# Patient Record
Sex: FEMALE | Race: WHITE | NOT HISPANIC OR LATINO | Employment: PART TIME | URBAN - METROPOLITAN AREA
[De-identification: names, ages, dates, MRNs, and addresses within clinical notes are randomized per-mention and may not be internally consistent; named-entity substitution may affect disease eponyms.]

---

## 2017-08-21 ENCOUNTER — APPOINTMENT (EMERGENCY)
Dept: RADIOLOGY | Facility: HOSPITAL | Age: 22
End: 2017-08-21
Payer: COMMERCIAL

## 2017-08-21 ENCOUNTER — HOSPITAL ENCOUNTER (EMERGENCY)
Facility: HOSPITAL | Age: 22
Discharge: HOME | End: 2017-08-21
Admitting: EMERGENCY MEDICINE
Payer: COMMERCIAL

## 2017-08-21 VITALS
WEIGHT: 144 LBS | OXYGEN SATURATION: 97 % | TEMPERATURE: 98.6 F | HEART RATE: 79 BPM | SYSTOLIC BLOOD PRESSURE: 130 MMHG | DIASTOLIC BLOOD PRESSURE: 60 MMHG | RESPIRATION RATE: 16 BRPM

## 2017-08-21 DIAGNOSIS — S93.602A SPRAIN OF LEFT FOOT, INITIAL ENCOUNTER: Primary | ICD-10-CM

## 2017-08-21 PROCEDURE — 99284 EMERGENCY DEPT VISIT MOD MDM: CPT

## 2017-08-21 PROCEDURE — 73630 X-RAY EXAM OF FOOT: CPT

## 2017-08-21 RX ORDER — ACETAMINOPHEN 325 MG/1
650 TABLET ORAL ONCE
Status: COMPLETED | OUTPATIENT
Start: 2017-08-21 | End: 2017-08-21

## 2017-08-21 RX ORDER — FLUOXETINE 20 MG/1
20 TABLET, FILM COATED ORAL DAILY
COMMUNITY

## 2017-08-21 RX ADMIN — ACETAMINOPHEN 650 MG: 325 TABLET, FILM COATED ORAL at 18:54

## 2024-04-14 ENCOUNTER — HOSPITAL ENCOUNTER (EMERGENCY)
Facility: HOSPITAL | Age: 29
Discharge: HOME | End: 2024-04-14
Attending: EMERGENCY MEDICINE | Admitting: EMERGENCY MEDICINE

## 2024-04-14 VITALS
RESPIRATION RATE: 18 BRPM | SYSTOLIC BLOOD PRESSURE: 102 MMHG | DIASTOLIC BLOOD PRESSURE: 51 MMHG | HEART RATE: 70 BPM | WEIGHT: 173.06 LBS | TEMPERATURE: 98 F | OXYGEN SATURATION: 99 %

## 2024-04-14 DIAGNOSIS — G89.18 POST-OP PAIN: ICD-10-CM

## 2024-04-14 DIAGNOSIS — R10.2 PELVIC CRAMPING: Primary | ICD-10-CM

## 2024-04-14 LAB
AMORPH URATE CRY URNS QL MICRO: ABNORMAL
ANION GAP SERPL CALCULATED.3IONS-SCNC: 5 MMOL/L (ref 4–13)
B-HCG SERPL-ACNC: 9604 MIU/ML (ref 0–5)
BACTERIA UR QL AUTO: ABNORMAL /HPF
BASOPHILS # BLD AUTO: 0.02 THOUSANDS/ÂΜL (ref 0–0.1)
BASOPHILS NFR BLD AUTO: 0 % (ref 0–1)
BILIRUB UR QL STRIP: NEGATIVE
BUN SERPL-MCNC: 10 MG/DL (ref 5–25)
CALCIUM SERPL-MCNC: 8.8 MG/DL (ref 8.4–10.2)
CHLORIDE SERPL-SCNC: 107 MMOL/L (ref 96–108)
CLARITY UR: CLEAR
CO2 SERPL-SCNC: 27 MMOL/L (ref 21–32)
COLOR UR: YELLOW
CREAT SERPL-MCNC: 0.88 MG/DL (ref 0.6–1.3)
EOSINOPHIL # BLD AUTO: 0.17 THOUSAND/ÂΜL (ref 0–0.61)
EOSINOPHIL NFR BLD AUTO: 3 % (ref 0–6)
ERYTHROCYTE [DISTWIDTH] IN BLOOD BY AUTOMATED COUNT: 12.2 % (ref 11.6–15.1)
GFR SERPL CREATININE-BSD FRML MDRD: 90 ML/MIN/1.73SQ M
GLUCOSE SERPL-MCNC: 95 MG/DL (ref 65–140)
GLUCOSE UR STRIP-MCNC: NEGATIVE MG/DL
HCT VFR BLD AUTO: 34.3 % (ref 34.8–46.1)
HGB BLD-MCNC: 11.4 G/DL (ref 11.5–15.4)
HGB UR QL STRIP.AUTO: ABNORMAL
HYALINE CASTS #/AREA URNS LPF: ABNORMAL /LPF
IMM GRANULOCYTES # BLD AUTO: 0.03 THOUSAND/UL (ref 0–0.2)
IMM GRANULOCYTES NFR BLD AUTO: 1 % (ref 0–2)
KETONES UR STRIP-MCNC: NEGATIVE MG/DL
LEUKOCYTE ESTERASE UR QL STRIP: ABNORMAL
LYMPHOCYTES # BLD AUTO: 1.77 THOUSANDS/ÂΜL (ref 0.6–4.47)
LYMPHOCYTES NFR BLD AUTO: 31 % (ref 14–44)
MAGNESIUM SERPL-MCNC: 1.9 MG/DL (ref 1.9–2.7)
MCH RBC QN AUTO: 29.3 PG (ref 26.8–34.3)
MCHC RBC AUTO-ENTMCNC: 33.2 G/DL (ref 31.4–37.4)
MCV RBC AUTO: 88 FL (ref 82–98)
MONOCYTES # BLD AUTO: 0.62 THOUSAND/ÂΜL (ref 0.17–1.22)
MONOCYTES NFR BLD AUTO: 11 % (ref 4–12)
MUCOUS THREADS UR QL AUTO: ABNORMAL
NEUTROPHILS # BLD AUTO: 3.05 THOUSANDS/ÂΜL (ref 1.85–7.62)
NEUTS SEG NFR BLD AUTO: 54 % (ref 43–75)
NITRITE UR QL STRIP: NEGATIVE
NON-SQ EPI CELLS URNS QL MICRO: ABNORMAL /HPF
NRBC BLD AUTO-RTO: 0 /100 WBCS
PH UR STRIP.AUTO: 7.5 [PH] (ref 4.5–8)
PLATELET # BLD AUTO: 302 THOUSANDS/UL (ref 149–390)
PMV BLD AUTO: 10.1 FL (ref 8.9–12.7)
POTASSIUM SERPL-SCNC: 3.8 MMOL/L (ref 3.5–5.3)
PROT UR STRIP-MCNC: NEGATIVE MG/DL
RBC # BLD AUTO: 3.89 MILLION/UL (ref 3.81–5.12)
RBC #/AREA URNS AUTO: ABNORMAL /HPF
SODIUM SERPL-SCNC: 139 MMOL/L (ref 135–147)
SP GR UR STRIP.AUTO: 1.02 (ref 1–1.03)
UROBILINOGEN UR QL STRIP.AUTO: 1 E.U./DL
WBC # BLD AUTO: 5.66 THOUSAND/UL (ref 4.31–10.16)
WBC #/AREA URNS AUTO: ABNORMAL /HPF

## 2024-04-14 PROCEDURE — 81001 URINALYSIS AUTO W/SCOPE: CPT

## 2024-04-14 PROCEDURE — 84702 CHORIONIC GONADOTROPIN TEST: CPT

## 2024-04-14 PROCEDURE — 36415 COLL VENOUS BLD VENIPUNCTURE: CPT

## 2024-04-14 PROCEDURE — 99284 EMERGENCY DEPT VISIT MOD MDM: CPT

## 2024-04-14 PROCEDURE — 83735 ASSAY OF MAGNESIUM: CPT

## 2024-04-14 PROCEDURE — 96361 HYDRATE IV INFUSION ADD-ON: CPT

## 2024-04-14 PROCEDURE — 80048 BASIC METABOLIC PNL TOTAL CA: CPT

## 2024-04-14 PROCEDURE — 96374 THER/PROPH/DIAG INJ IV PUSH: CPT

## 2024-04-14 PROCEDURE — 85025 COMPLETE CBC W/AUTO DIFF WBC: CPT

## 2024-04-14 PROCEDURE — 96375 TX/PRO/DX INJ NEW DRUG ADDON: CPT

## 2024-04-14 RX ORDER — METOCLOPRAMIDE HYDROCHLORIDE 5 MG/ML
10 INJECTION INTRAMUSCULAR; INTRAVENOUS ONCE
Status: COMPLETED | OUTPATIENT
Start: 2024-04-14 | End: 2024-04-14

## 2024-04-14 RX ORDER — KETOROLAC TROMETHAMINE 30 MG/ML
15 INJECTION, SOLUTION INTRAMUSCULAR; INTRAVENOUS ONCE
Status: COMPLETED | OUTPATIENT
Start: 2024-04-14 | End: 2024-04-14

## 2024-04-14 RX ORDER — FENTANYL CITRATE 50 UG/ML
50 INJECTION, SOLUTION INTRAMUSCULAR; INTRAVENOUS ONCE
Status: COMPLETED | OUTPATIENT
Start: 2024-04-14 | End: 2024-04-14

## 2024-04-14 RX ADMIN — SODIUM CHLORIDE 1000 ML: 0.9 INJECTION, SOLUTION INTRAVENOUS at 22:09

## 2024-04-14 RX ADMIN — FENTANYL CITRATE 50 MCG: 50 INJECTION INTRAMUSCULAR; INTRAVENOUS at 23:27

## 2024-04-14 RX ADMIN — KETOROLAC TROMETHAMINE 15 MG: 30 INJECTION, SOLUTION INTRAMUSCULAR; INTRAVENOUS at 22:10

## 2024-04-14 RX ADMIN — METOCLOPRAMIDE HYDROCHLORIDE 10 MG: 5 INJECTION INTRAMUSCULAR; INTRAVENOUS at 22:10

## 2024-04-15 ENCOUNTER — APPOINTMENT (EMERGENCY)
Dept: ULTRASOUND IMAGING | Facility: HOSPITAL | Age: 29
End: 2024-04-15

## 2024-04-15 ENCOUNTER — HOSPITAL ENCOUNTER (EMERGENCY)
Facility: HOSPITAL | Age: 29
Discharge: HOME | End: 2024-04-15
Attending: EMERGENCY MEDICINE

## 2024-04-15 VITALS
SYSTOLIC BLOOD PRESSURE: 126 MMHG | RESPIRATION RATE: 18 BRPM | TEMPERATURE: 98 F | DIASTOLIC BLOOD PRESSURE: 61 MMHG | OXYGEN SATURATION: 100 % | WEIGHT: 173.06 LBS | HEART RATE: 58 BPM

## 2024-04-15 DIAGNOSIS — O03.4 RETAINED PRODUCTS OF CONCEPTION FOLLOWING ABORTION: Primary | ICD-10-CM

## 2024-04-15 DIAGNOSIS — Z87.42 HISTORY OF TERMINATION OF PREGNANCY: ICD-10-CM

## 2024-04-15 LAB
BASOPHILS # BLD AUTO: 0.04 THOUSANDS/ÂΜL (ref 0–0.1)
BASOPHILS NFR BLD AUTO: 1 % (ref 0–1)
EOSINOPHIL # BLD AUTO: 0.2 THOUSAND/ÂΜL (ref 0–0.61)
EOSINOPHIL NFR BLD AUTO: 3 % (ref 0–6)
ERYTHROCYTE [DISTWIDTH] IN BLOOD BY AUTOMATED COUNT: 12.2 % (ref 11.6–15.1)
HCT VFR BLD AUTO: 36.4 % (ref 34.8–46.1)
HGB BLD-MCNC: 11.9 G/DL (ref 11.5–15.4)
IMM GRANULOCYTES # BLD AUTO: 0.01 THOUSAND/UL (ref 0–0.2)
IMM GRANULOCYTES NFR BLD AUTO: 0 % (ref 0–2)
LYMPHOCYTES # BLD AUTO: 2.32 THOUSANDS/ÂΜL (ref 0.6–4.47)
LYMPHOCYTES NFR BLD AUTO: 40 % (ref 14–44)
MCH RBC QN AUTO: 28.8 PG (ref 26.8–34.3)
MCHC RBC AUTO-ENTMCNC: 32.7 G/DL (ref 31.4–37.4)
MCV RBC AUTO: 88 FL (ref 82–98)
MONOCYTES # BLD AUTO: 0.79 THOUSAND/ÂΜL (ref 0.17–1.22)
MONOCYTES NFR BLD AUTO: 14 % (ref 4–12)
NEUTROPHILS # BLD AUTO: 2.47 THOUSANDS/ÂΜL (ref 1.85–7.62)
NEUTS SEG NFR BLD AUTO: 42 % (ref 43–75)
NRBC BLD AUTO-RTO: 0 /100 WBCS
PLATELET # BLD AUTO: 339 THOUSANDS/UL (ref 149–390)
PMV BLD AUTO: 9.7 FL (ref 8.9–12.7)
RBC # BLD AUTO: 4.13 MILLION/UL (ref 3.81–5.12)
WBC # BLD AUTO: 5.83 THOUSAND/UL (ref 4.31–10.16)

## 2024-04-15 PROCEDURE — 85025 COMPLETE CBC W/AUTO DIFF WBC: CPT | Performed by: EMERGENCY MEDICINE

## 2024-04-15 PROCEDURE — 76856 US EXAM PELVIC COMPLETE: CPT

## 2024-04-15 PROCEDURE — 36415 COLL VENOUS BLD VENIPUNCTURE: CPT | Performed by: EMERGENCY MEDICINE

## 2024-04-15 PROCEDURE — NC001 PR NO CHARGE: Performed by: OBSTETRICS & GYNECOLOGY

## 2024-04-15 PROCEDURE — 99284 EMERGENCY DEPT VISIT MOD MDM: CPT

## 2024-04-15 RX ORDER — MISOPROSTOL 200 UG/1
800 TABLET ORAL ONCE
Qty: 4 TABLET | Refills: 0 | Status: SHIPPED | OUTPATIENT
Start: 2024-04-15 | End: 2024-04-15

## 2024-04-15 NOTE — ED PROVIDER NOTES
History  Chief Complaint   Patient presents with    Abdominal Pain     Patient states had an  3 days ago, having abdominal cramps radiating to back and leg, also has an untreated uti that she states should have been treated before her surgery      Ivet is a 28-year-old female G1, P0 presenting to the emergency department 3 days after surgical  with lower abdominal cramping x 1 day.  She reports that since the procedure she has had mild cramping and bleeding.  Her bleeding is tapering off but her abdominal cramping became worse in the past few hours and is now going into her back.  She had the procedure done at [facility].  Denies fevers, chills.  Reports that she had a UTI diagnosed prior to the procedure but did not take the antibiotics, is now concerned that the UTI may be worse.      Abdominal Pain  Pain location:  Suprapubic  Pain quality: cramping    Pain radiates to:  L leg and R leg  Pain severity:  Severe  Onset quality:  Gradual  Duration:  1 day  Timing:  Constant  Progression:  Worsening  Chronicity:  New  Relieved by:  Nothing  Worsened by:  Nothing  Ineffective treatments:  None tried  Associated symptoms: vaginal bleeding    Associated symptoms: no anorexia, no belching, no chest pain, no chills, no constipation, no cough, no diarrhea, no dysuria, no fatigue, no fever, no flatus, no hematemesis, no hematochezia, no hematuria, no melena, no nausea, no shortness of breath, no vaginal discharge and no vomiting        Prior to Admission Medications   Prescriptions Last Dose Informant Patient Reported? Taking?   FLUoxetine (PROzac) 20 MG tablet   Yes No   Sig: Take 20 mg by mouth daily      Facility-Administered Medications: None       No past medical history on file.    No past surgical history on file.    No family history on file.  I have reviewed and agree with the history as documented.    E-Cigarette/Vaping     E-Cigarette/Vaping Substances     Social History     Tobacco Use     Smoking status: Never   Substance Use Topics    Alcohol use: No    Drug use: No       Review of Systems   Constitutional:  Negative for chills, fatigue and fever.   Respiratory:  Negative for cough and shortness of breath.    Cardiovascular:  Negative for chest pain.   Gastrointestinal:  Positive for abdominal pain. Negative for anorexia, constipation, diarrhea, flatus, hematemesis, hematochezia, melena, nausea and vomiting.   Genitourinary:  Positive for vaginal bleeding. Negative for dysuria, hematuria and vaginal discharge.   Neurological:  Negative for weakness and numbness.   All other systems reviewed and are negative.      Physical Exam  Physical Exam  Vitals and nursing note reviewed.   Constitutional:       General: She is not in acute distress.     Appearance: She is well-developed. She is not ill-appearing.   HENT:      Head: Normocephalic and atraumatic.   Eyes:      Conjunctiva/sclera: Conjunctivae normal.   Cardiovascular:      Rate and Rhythm: Normal rate and regular rhythm.      Heart sounds: Normal heart sounds. No murmur heard.  Pulmonary:      Effort: Pulmonary effort is normal. No respiratory distress.      Breath sounds: Normal breath sounds. No wheezing, rhonchi or rales.   Abdominal:      Palpations: Abdomen is soft.      Tenderness: There is abdominal tenderness in the right lower quadrant, suprapubic area and left lower quadrant. There is no right CVA tenderness, left CVA tenderness, guarding or rebound.      Hernia: No hernia is present.   Musculoskeletal:         General: No swelling.      Cervical back: Neck supple.   Skin:     General: Skin is warm and dry.      Capillary Refill: Capillary refill takes less than 2 seconds.      Coloration: Skin is not pale.      Findings: No rash.   Neurological:      General: No focal deficit present.      Mental Status: She is alert and oriented to person, place, and time.      Cranial Nerves: No cranial nerve deficit.   Psychiatric:         Mood  and Affect: Mood normal.         Vital Signs  ED Triage Vitals   Temperature Pulse Respirations Blood Pressure SpO2   04/14/24 2124 04/14/24 2124 04/14/24 2124 04/14/24 2124 04/14/24 2124   98 °F (36.7 °C) 73 18 135/65 99 %      Temp src Heart Rate Source Patient Position - Orthostatic VS BP Location FiO2 (%)   -- 04/14/24 2124 04/14/24 2124 04/14/24 2124 --    Monitor Sitting Right arm       Pain Score       04/14/24 2209       7           Vitals:    04/14/24 2124 04/14/24 2326   BP: 135/65 102/51   Pulse: 73 70   Patient Position - Orthostatic VS: Sitting Lying         Visual Acuity      ED Medications  Medications   sodium chloride 0.9 % bolus 1,000 mL (0 mL Intravenous Stopped 4/14/24 2327)   ketorolac (TORADOL) injection 15 mg (15 mg Intravenous Given 4/14/24 2210)   metoclopramide (REGLAN) injection 10 mg (10 mg Intravenous Given 4/14/24 2210)   fentaNYL injection 50 mcg (50 mcg Intravenous Given 4/14/24 2327)       Diagnostic Studies  Results Reviewed       Procedure Component Value Units Date/Time    Quantitative hCG [[id number]]  (Abnormal) Collected: 04/14/24 2208    Lab Status: Final result Specimen: Blood from Arm, Right Updated: 04/14/24 2319     HCG, Quant 9,604 mIU/mL     Narrative:       Expected Ranges:    HCG results between 5 and 25 mIU/mL may be indicative of early pregnancy but should be interpreted in light of the total clinical presentation.    HCG can rise to detectable levels in rose marie and post menopausal women (0-11.6 mIU/mL).     Approximate               Approximate HCG  Gestation age          Concentration ( mIU/mL)  _____________          ______________________   Weeks                      HCG values  0.2-1                       5-50  1-2                           2-3                         100-5000  3-4                         500-77544  4-5                         1000-07437  5-6                         84122-934410  6-8                         44652-184488  8-12                         93771-352460      Magnesium [[id number]]  (Normal) Collected: 04/14/24 2208    Lab Status: Final result Specimen: Blood from Arm, Right Updated: 04/14/24 2248     Magnesium 1.9 mg/dL     Basic metabolic panel [[id number]] Collected: 04/14/24 2208    Lab Status: Final result Specimen: Blood from Arm, Right Updated: 04/14/24 2248     Sodium 139 mmol/L      Potassium 3.8 mmol/L      Chloride 107 mmol/L      CO2 27 mmol/L      ANION GAP 5 mmol/L      BUN 10 mg/dL      Creatinine 0.88 mg/dL      Glucose 95 mg/dL      Calcium 8.8 mg/dL      eGFR 90 ml/min/1.73sq m     Narrative:      National Kidney Disease Foundation guidelines for Chronic Kidney Disease (CKD):     Stage 1 with normal or high GFR (GFR > 90 mL/min/1.73 square meters)    Stage 2 Mild CKD (GFR = 60-89 mL/min/1.73 square meters)    Stage 3A Moderate CKD (GFR = 45-59 mL/min/1.73 square meters)    Stage 3B Moderate CKD (GFR = 30-44 mL/min/1.73 square meters)    Stage 4 Severe CKD (GFR = 15-29 mL/min/1.73 square meters)    Stage 5 End Stage CKD (GFR <15 mL/min/1.73 square meters)  Note: GFR calculation is accurate only with a steady state creatinine    CBC and differential [[id number]]  (Abnormal) Collected: 04/14/24 2208    Lab Status: Final result Specimen: Blood from Arm, Right Updated: 04/14/24 2216     WBC 5.66 Thousand/uL      RBC 3.89 Million/uL      Hemoglobin 11.4 g/dL      Hematocrit 34.3 %      MCV 88 fL      MCH 29.3 pg      MCHC 33.2 g/dL      RDW 12.2 %      MPV 10.1 fL      Platelets 302 Thousands/uL      nRBC 0 /100 WBCs      Segmented % 54 %      Immature Grans % 1 %      Lymphocytes % 31 %      Monocytes % 11 %      Eosinophils Relative 3 %      Basophils Relative 0 %      Absolute Neutrophils 3.05 Thousands/µL      Absolute Immature Grans 0.03 Thousand/uL      Absolute Lymphocytes 1.77 Thousands/µL      Absolute Monocytes 0.62 Thousand/µL      Eosinophils Absolute 0.17 Thousand/µL      Basophils Absolute 0.02 Thousands/µL     Urine  Microscopic [[id number]]  (Abnormal) Collected: 24    Lab Status: Final result Specimen: Urine, Clean Catch Updated: 24     RBC, UA None Seen /hpf      WBC, UA 4-10 /hpf      Epithelial Cells Occasional /hpf      Bacteria, UA Occasional /hpf      MUCUS THREADS Occasional     Hyaline Casts, UA 0-3 /lpf      Amorphous Crystals, UA Moderate    Urine Macroscopic, POC [[id number]]  (Abnormal) Collected: 24    Lab Status: Final result Specimen: Urine Updated: 24     Color, UA Yellow     Clarity, UA Clear     pH, UA 7.5     Leukocytes, UA Trace     Nitrite, UA Negative     Protein, UA Negative mg/dl      Glucose, UA Negative mg/dl      Ketones, UA Negative mg/dl      Urobilinogen, UA 1.0 E.U./dl      Bilirubin, UA Negative     Occult Blood, UA Trace     Specific Gravity, UA 1.020    Narrative:      CLINITEK RESULT                   No orders to display              Procedures  Procedures         ED Course                               SBIRT 20yo+      Flowsheet Row Most Recent Value   Initial Alcohol Screen: US AUDIT-C     1. How often do you have a drink containing alcohol? 0 Filed at: 2024   2. How many drinks containing alcohol do you have on a typical day you are drinking?  0 Filed at: 2024   3a. Male UNDER 65: How often do you have five or more drinks on one occasion? 0 Filed at: 2024   3b. FEMALE Any Age, or MALE 65+: How often do you have 4 or more drinks on one occassion? 0 Filed at: 2024   Audit-C Score 0 Filed at: 2024   CANDIDA: How many times in the past year have you...    Used an illegal drug or used a prescription medication for non-medical reasons? Never Filed at: 2024                      Medical Decision Making  Patient presents status post surgical  with lower abdominal cramping and pain.  Basic labs ordered to rule out infection and electrolyte abnormality.  Vitals benign, WBC not elevated.   Discussed with OB and they recommended ultrasound to rule out retained products.  Patient states that she is unable to stay for the ultrasound tech to come in.  She states that she is willing to sign an AMA but will return as soon as she can.  She states that she is aware of risk of worsening infection in the case of retained products, we discussed the risks of leaving and she states understanding.  Discussed symptoms that should indicate immediate return.    Amount and/or Complexity of Data Reviewed  Labs: ordered.    Risk  Prescription drug management.             Disposition  Final diagnoses:   Pelvic cramping   Post-op pain     Time reflects when diagnosis was documented in both MDM as applicable and the Disposition within this note       Time User Action Codes Description Comment    4/14/2024 11:29 PM Anne-Marie Ventura [R10.2] Pelvic cramping     4/14/2024 11:30 PM Anne-Marie Ventura [G89.18] Post-op pain           ED Disposition       ED Disposition   AMA    Condition   --    Date/Time   Sun Apr 14, 2024 3548    Comment   Date: 4/14/2024  Patient: Ivet Madsen  Admitted: 4/14/2024  9:26 PM  Attending Provider: Adam Green MD    Ivet Madsen or her authorized caregiver has made the decision for the patient to leave the emergency department against the  advice of the emergency department staff. She or her authorized caregiver has been informed and understands the inherent risks, including death, infection, sepsis, permanent diability.  She or her authorized caregiver has decided to accept the respon sibility for this decision. Ivet Madsen and all necessary parties have been advised that she may return for further evaluation or treatment. Her condition at time of discharge was stable.  Ivet Madsen had current vital signs as follows:  BP  102/51 (BP Location: Right arm)   Pulse 70   Temp 98 °F (36.7 °C)   Resp 18   Wt 78.5 kg (173 lb 1 oz)                Follow-up Information        Follow up With Specialties Details Why Contact Info Additional Information    [facility]   [address]  [address] [address] [phone] [facility] [address], [place] [facility], [place] [address] [phone]            Discharge Medication List as of 4/14/2024 11:32 PM        CONTINUE these medications which have NOT CHANGED    Details   FLUoxetine (PROzac) 20 MG tablet Take 20 mg by mouth daily, Historical Med             No discharge procedures on file.    PDMP Review       None            ED Provider  Electronically Signed by             Anne-Marie Ventura PA-C  04/15/24 0601

## 2024-04-15 NOTE — PROGRESS NOTES
Consultation - Gynecology  Ivet Madsen 28 y.o. female MRN: [id number]  Unit/Bed#: ED-11 Encounter: [id number]      Consult to obstetrics / gynecology  Consult performed by: Clement Fuller DO  Consult ordered by: Divine Bee DO            Chief Complaint   Patient presents with    Post-op Problem     Pt reports having surgical  recently, was here last night but could not stay due to work. Having chills, headache, abdominal and back cramping. Was told she needed to stay for an ultrasound so she states she is here now for one        Assessment/Plan      History of termination of pregnancy  Assessment & Plan  Pt reports manual vacuum aspiration at [facility] 4 days ago  Reports intermittent cramping, and low back pain.  Reports bleeding has decreased over the past 4 days  TVUS IMPRESSION:  Endometrial cavity distended by heterogeneous nonvascular material which likely represent blood products, however avascular retained products of conception would not be excluded.  Beta-hCG from prior ED visit on  found to be 9604.  No prior hCG available   Benign physical exam.  She was counseled on medical and surgical options including manual vacuum aspiration and dilation and curettage.  Given that she recently had a manual vacuum aspiration, she is electing for medication if there are any retained products.    Plan for buccal cytotec in outpatient setting as pt has to work tomorrow and Wednesday in [employer] [facility] [employer].  Scription sent to [place] pharmacy as patient is staying with her mother who lives down the street from the hospital.  She [facility], but would like to discuss contraception with her primary OB/GYN in [place].  Encouraged follow-up with her primary OB/GYN in the coming weeks.  Discussed strict return precautions regarding potential for retained products, infection, worsening pain or bleeding.    WCB & ANC     Results from last 7 days   Lab Units  04/15/24  1749 24  2208   WBC Thousand/uL 5.83 5.66   TOTAL NEUT ABS Thousands/µL 2.47 3.05    or Hgb / Hct       Results from last 7 days   Lab Units 04/15/24  1749 24  2208   HEMOGLOBIN g/dL 11.9 11.4*   HEMATOCRIT % 36.4 34.3*            History of Present Illness:  Physician Requesting Consult: Divine Bee*  Reason for Consult / Principal Problem: s/p elective termination of pregnancy, abdominal cramping  HPI: Ivet Madsen is a 28 y.o.  female who presents with abdominal pain and bleeding [facility]  She reports that she had a manual vacuum aspiration.  Since that time she has had some lower abdominal cramping and aching pain in her low back.  She had been taking Motrin for the past several days and switch to Excedrin after her Motrin ran out.  She notes that her pain has decreased over the past several days.  She denies any excess vaginal bleeding.  Reports that her bleeding has tapered off since her procedure.  No recent fever or chills.     In regards to contraception, she reports that she was given oral contraceptive pills after her procedure, but has not yet started taking them.  She follows with an OB/GYN in [place] and has not yet followed up with them in regards to her plan for contraception going forward.    OB/GYN history.  Reports that she has had 3 prior elective terminations.  1 medication termination, 1 D&C under sedation, and most recently her manual vacuum aspiration 2024      Historical Information   History reviewed. No pertinent past medical history.  History reviewed. No pertinent surgical history.  OB History    Para Term  AB Living   3 0     3     SAB IAB Ectopic Multiple Live Births                  # Outcome Date GA Lbr Xu/2nd Weight Sex Delivery Anes PTL Lv   3 AB 24     TAB      2 AB            1 AB              History reviewed. No pertinent family history.  Social  History   Social History     Substance and Sexual Activity   Alcohol Use No     Social History     Substance and Sexual Activity   Drug Use No     Social History     Tobacco Use   Smoking Status Never   Smokeless Tobacco Not on file     Allergies   Allergen Reactions    Augmentin [Amoxicillin-Pot Clavulanate]        Objective   Vitals: Blood pressure 126/61, pulse 58, temperature 98 °F (36.7 °C), temperature source Oral, resp. rate 18, weight 78.5 kg (173 lb 1 oz), SpO2 100%. There is no height or weight on file to calculate BMI.    Invasive Devices       None                   Physical Exam  Vitals reviewed. Exam conducted with a chaperone present.   Constitutional:       General: She is not in acute distress.     Appearance: She is not ill-appearing, toxic-appearing or diaphoretic.   HENT:      Head: Normocephalic and atraumatic.   Eyes:      Extraocular Movements: Extraocular movements intact.      Conjunctiva/sclera: Conjunctivae normal.   Cardiovascular:      Rate and Rhythm: Normal rate.      Pulses: Normal pulses.   Pulmonary:      Effort: Pulmonary effort is normal. No respiratory distress.   Abdominal:      Palpations: Abdomen is soft.      Comments: Minimally tender to palpation in suprapubic region, no rebound or guarding present, no distention, bowel sounds present   Genitourinary:     General: Normal vulva.      Labia:         Right: No rash, tenderness, lesion or injury.         Left: No rash, tenderness, lesion or injury.       Vagina: Normal. No signs of injury. No vaginal discharge, erythema, tenderness or lesions.      Cervix: Normal. No cervical motion tenderness, friability or erythema.      Uterus: Normal. Not enlarged.       Adnexa:         Right: No mass or tenderness.          Left: No mass or tenderness.        Comments: Minimal amount of dark brown vaginal discharge likely secondary to old bleeding after her procedure, cervical os minimally dilated, no evidence of injury to cervix or  vaginal wall.  Musculoskeletal:      Comments: Minimal tenderness to palpation of musculature of lower lumbar spine, no CVA tenderness, no erythema or ecchymosis present   Skin:     General: Skin is warm and dry.   Neurological:      General: No focal deficit present.      Mental Status: She is alert.      Motor: No weakness.         Lab Results:   Recent Results (from the past 24 hour(s))   Urine Macroscopic, POC    Collection Time: 04/14/24  9:43 PM   Result Value Ref Range    Color, UA Yellow     Clarity, UA Clear     pH, UA 7.5 4.5 - 8.0    Leukocytes, UA Trace (A) Negative    Nitrite, UA Negative Negative    Protein, UA Negative Negative mg/dl    Glucose, UA Negative Negative mg/dl    Ketones, UA Negative Negative mg/dl    Urobilinogen, UA 1.0 0.2, 1.0 E.U./dl E.U./dl    Bilirubin, UA Negative Negative    Occult Blood, UA Trace (A) Negative    Specific Gravity, UA 1.020 1.003 - 1.030   Urine Microscopic    Collection Time: 04/14/24  9:43 PM   Result Value Ref Range    RBC, UA None Seen None Seen, 1-2 /hpf    WBC, UA 4-10 (A) None Seen, 1-2 /hpf    Epithelial Cells Occasional None Seen, Occasional /hpf    Bacteria, UA Occasional None Seen, Occasional /hpf    MUCUS THREADS Occasional (A) None Seen    Hyaline Casts, UA 0-3 (A) None Seen /lpf    Amorphous Crystals, UA Moderate    CBC and differential    Collection Time: 04/14/24 10:08 PM   Result Value Ref Range    WBC 5.66 4.31 - 10.16 Thousand/uL    RBC 3.89 3.81 - 5.12 Million/uL    Hemoglobin 11.4 (L) 11.5 - 15.4 g/dL    Hematocrit 34.3 (L) 34.8 - 46.1 %    MCV 88 82 - 98 fL    MCH 29.3 26.8 - 34.3 pg    MCHC 33.2 31.4 - 37.4 g/dL    RDW 12.2 11.6 - 15.1 %    MPV 10.1 8.9 - 12.7 fL    Platelets 302 149 - 390 Thousands/uL    nRBC 0 /100 WBCs    Segmented % 54 43 - 75 %    Immature Grans % 1 0 - 2 %    Lymphocytes % 31 14 - 44 %    Monocytes % 11 4 - 12 %    Eosinophils Relative 3 0 - 6 %    Basophils Relative 0 0 - 1 %    Absolute Neutrophils 3.05 1.85 - 7.62  Thousands/µL    Absolute Immature Grans 0.03 0.00 - 0.20 Thousand/uL    Absolute Lymphocytes 1.77 0.60 - 4.47 Thousands/µL    Absolute Monocytes 0.62 0.17 - 1.22 Thousand/µL    Eosinophils Absolute 0.17 0.00 - 0.61 Thousand/µL    Basophils Absolute 0.02 0.00 - 0.10 Thousands/µL   Magnesium    Collection Time: 04/14/24 10:08 PM   Result Value Ref Range    Magnesium 1.9 1.9 - 2.7 mg/dL   Quantitative hCG    Collection Time: 04/14/24 10:08 PM   Result Value Ref Range    HCG, Quant 9,604 (H) 0 - 5 mIU/mL   Basic metabolic panel    Collection Time: 04/14/24 10:08 PM   Result Value Ref Range    Sodium 139 135 - 147 mmol/L    Potassium 3.8 3.5 - 5.3 mmol/L    Chloride 107 96 - 108 mmol/L    CO2 27 21 - 32 mmol/L    ANION GAP 5 4 - 13 mmol/L    BUN 10 5 - 25 mg/dL    Creatinine 0.88 0.60 - 1.30 mg/dL    Glucose 95 65 - 140 mg/dL    Calcium 8.8 8.4 - 10.2 mg/dL    eGFR 90 ml/min/1.73sq m   CBC and differential    Collection Time: 04/15/24  5:49 PM   Result Value Ref Range    WBC 5.83 4.31 - 10.16 Thousand/uL    RBC 4.13 3.81 - 5.12 Million/uL    Hemoglobin 11.9 11.5 - 15.4 g/dL    Hematocrit 36.4 34.8 - 46.1 %    MCV 88 82 - 98 fL    MCH 28.8 26.8 - 34.3 pg    MCHC 32.7 31.4 - 37.4 g/dL    RDW 12.2 11.6 - 15.1 %    MPV 9.7 8.9 - 12.7 fL    Platelets 339 149 - 390 Thousands/uL    nRBC 0 /100 WBCs    Segmented % 42 (L) 43 - 75 %    Immature Grans % 0 0 - 2 %    Lymphocytes % 40 14 - 44 %    Monocytes % 14 (H) 4 - 12 %    Eosinophils Relative 3 0 - 6 %    Basophils Relative 1 0 - 1 %    Absolute Neutrophils 2.47 1.85 - 7.62 Thousands/µL    Absolute Immature Grans 0.01 0.00 - 0.20 Thousand/uL    Absolute Lymphocytes 2.32 0.60 - 4.47 Thousands/µL    Absolute Monocytes 0.79 0.17 - 1.22 Thousand/µL    Eosinophils Absolute 0.20 0.00 - 0.61 Thousand/µL    Basophils Absolute 0.04 0.00 - 0.10 Thousands/µL       Clement Fuller DO  4/15/2024  9:07 PM

## 2024-04-15 NOTE — ED PROVIDER NOTES
Pt Name: Ivet Madsen  MRN: [id number]  Birthdate 1995  Age/Sex: 28 y.o. female  Date of evaluation: 4/15/2024  PCP: No primary care provider on file.    CHIEF COMPLAINT    Chief Complaint   Patient presents with    Post-op Problem     Pt reports having surgical  recently, was here last night but could not stay due to work. Having chills, headache, abdominal and back cramping. Was told she needed to stay for an ultrasound so she states she is here now for one          HPI    Ivet presents to the Emergency Department complaining of some cramping and bleeding.  She was here last night and was recommended to have an ultrasound.  At that time she needed to leave and take care of things for her job.  Now she has returned for her US.  She has minimal pain and mild bleeding.        HPI      Past Medical and Surgical History    History reviewed. No pertinent past medical history.    History reviewed. No pertinent surgical history.    History reviewed. No pertinent family history.    Social History     Tobacco Use    Smoking status: Never   Substance Use Topics    Alcohol use: No    Drug use: No         .    Allergies    Allergies   Allergen Reactions    Augmentin [Amoxicillin-Pot Clavulanate]        Home Medications    Prior to Admission medications    Medication Sig Start Date End Date Taking? Authorizing Provider   FLUoxetine (PROzac) 20 MG tablet Take 20 mg by mouth daily    Historical Provider, MD           Review of Systems    Review of Systems   Constitutional:  Negative for chills and fever.   HENT:  Negative for ear pain and sore throat.    Eyes:  Negative for pain and visual disturbance.   Respiratory:  Negative for cough and shortness of breath.    Cardiovascular:  Negative for chest pain and palpitations.   Gastrointestinal:  Negative for abdominal pain and vomiting.   Genitourinary:  Positive for frequency, pelvic pain and vaginal bleeding. Negative for dysuria and hematuria.    Musculoskeletal:  Negative for arthralgias and back pain.   Skin:  Negative for color change and rash.   Neurological:  Negative for seizures and syncope.   All other systems reviewed and are negative.      Physical Exam      ED Triage Vitals   Temperature Pulse Respirations Blood Pressure SpO2   04/15/24 1549 04/15/24 1549 04/15/24 1549 04/15/24 1549 04/15/24 1549   98 °F (36.7 °C) 69 18 120/59 100 %      Temp Source Heart Rate Source Patient Position - Orthostatic VS BP Location FiO2 (%)   04/15/24 1549 04/15/24 1549 04/15/24 1549 04/15/24 1549 --   Oral Monitor Sitting Right arm       Pain Score       04/15/24 1844       5               Physical Exam  Vitals and nursing note reviewed.   Constitutional:       General: She is not in acute distress.     Appearance: She is well-developed.   HENT:      Head: Normocephalic and atraumatic.   Eyes:      Conjunctiva/sclera: Conjunctivae normal.   Cardiovascular:      Rate and Rhythm: Normal rate and regular rhythm.      Heart sounds: No murmur heard.  Pulmonary:      Effort: Pulmonary effort is normal. No respiratory distress.      Breath sounds: Normal breath sounds.   Abdominal:      Palpations: Abdomen is soft.      Tenderness: There is no abdominal tenderness.   Musculoskeletal:         General: No swelling.      Cervical back: Neck supple.   Skin:     General: Skin is warm and dry.      Capillary Refill: Capillary refill takes less than 2 seconds.   Neurological:      Mental Status: She is alert.   Psychiatric:         Mood and Affect: Mood normal.                Assessment and Plan    Ivet Madsen is a 28 y.o. female who presents with recent surgical termination of pregnancy. Physical examination unremarkable. Differential diagnosis (not completely inclusive) includes retained POC. Plan will be to perform diagnostic testing and treat symptomatically.      MDM      Diagnostic Results        Labs: Reviewed from yesterday's visit    Results for orders placed or  performed during the hospital encounter of 04/15/24   CBC and differential   Result Value Ref Range    WBC 5.83 4.31 - 10.16 Thousand/uL    RBC 4.13 3.81 - 5.12 Million/uL    Hemoglobin 11.9 11.5 - 15.4 g/dL    Hematocrit 36.4 34.8 - 46.1 %    MCV 88 82 - 98 fL    MCH 28.8 26.8 - 34.3 pg    MCHC 32.7 31.4 - 37.4 g/dL    RDW 12.2 11.6 - 15.1 %    MPV 9.7 8.9 - 12.7 fL    Platelets 339 149 - 390 Thousands/uL    nRBC 0 /100 WBCs    Segmented % 42 (L) 43 - 75 %    Immature Grans % 0 0 - 2 %    Lymphocytes % 40 14 - 44 %    Monocytes % 14 (H) 4 - 12 %    Eosinophils Relative 3 0 - 6 %    Basophils Relative 1 0 - 1 %    Absolute Neutrophils 2.47 1.85 - 7.62 Thousands/µL    Absolute Immature Grans 0.01 0.00 - 0.20 Thousand/uL    Absolute Lymphocytes 2.32 0.60 - 4.47 Thousands/µL    Absolute Monocytes 0.79 0.17 - 1.22 Thousand/µL    Eosinophils Absolute 0.20 0.00 - 0.61 Thousand/µL    Basophils Absolute 0.04 0.00 - 0.10 Thousands/µL       All labs reviewed and utilized in the medical decision making process    Radiology:    US pelvis complete   Final Result      Endometrial cavity distended by heterogeneous nonvascular material which likely represent blood products, however avascular retained products of conception would not be excluded.      The study was marked in EPIC for immediate notification.         Workstation performed: [id number]             All radiology studies independently viewed by me and interpreted by the radiologist.    Procedure    Procedures      ED Course of Care and Re-Assessments    I reached out to OB with US results.  They requested CBC and they will come and see her in the ED.      Medications - No data to display    Patient was seen by OB.  Plan for outpatient Cytotec and follow up.      Patient left prior to discharge instructions.       FINAL IMPRESSION    Final diagnoses:   Retained products of conception following          DISPOSITION/PLAN      Time reflects when diagnosis was  documented in both MDM as applicable and the Disposition within this note       Time User Action Codes Description Comment    4/15/2024  7:31 PM Divine Bee Add [O03.4] Retained products of conception following      4/15/2024  9:03 PM Clement Fuller Add [Z87.42] History of termination of pregnancy           ED Disposition       ED Disposition   Discharge    Condition   Stable    Date/Time   Mon Apr 15, 2024  8:49 PM    Comment   Ivet Madsen discharge to home/self care.                   Follow-up Information       Follow up With Specialties Details Why Contact Info Additional Information    [facility] Emergency Department Emergency Medicine  As needed, If symptoms worsen [address] [place] [id number]  [phone] [facility] Emergency Department, [address] [place] [address], [phone]    [facility] Emergency Department Emergency Medicine   [address] [place] [id number]  [phone] [facility] Emergency Department, [address] [place] [address], [phone]              PATIENT REFERRED TO:    [facility] Emergency Department  [address] [place] [id number]  [phone]    As needed, If symptoms worsen    [facility] Emergency Department  [address] [place] [phone]  [phone]          DISCHARGE MEDICATIONS:    Discharge Medication List as of 4/15/2024  9:08 PM        CONTINUE these medications which have CHANGED    Details   miSOPROStol (Cytotec) 200 mcg tablet Take 4 tablets (800 mcg total) by mouth 1 (one) time for 1 dose Take buccally - ask pharmacist if you have question about how to take medication, Starting Mon 4/15/2024, Normal           CONTINUE these medications which have NOT CHANGED    Details   FLUoxetine (PROzac) 20 MG tablet Take 20 mg by mouth daily, [facility]              No discharge procedures on file.         Divine Bee, DO Divine Bee,   04/15/24 7685

## 2024-04-16 NOTE — DISCHARGE INSTR - AVS FIRST PAGE
Please follow-up with your primary OB/GYN for postprocedural follow-up and discussion of contraception plan.

## 2024-04-16 NOTE — ASSESSMENT & PLAN NOTE
Pt reports manual vacuum aspiration at [facility] 4 days ago  Reports intermittent cramping, and low back pain.  Reports bleeding has decreased over the past 4 days  TVUS IMPRESSION:  Endometrial cavity distended by heterogeneous nonvascular material which likely represent blood products, however avascular retained products of conception would not be excluded.  Beta-hCG from prior ED visit on 4/14 found to be 9604.  No prior hCG available   Benign physical exam.  She was counseled on medical and surgical options including manual vacuum aspiration and dilation and curettage.  Given that she recently had a manual vacuum aspiration, she is electing for medication if there are any retained products.    Plan for buccal cytotec in outpatient setting as pt has to work tomorrow and Wednesday in [facility].  Scription sent to [facility] pharmacy as patient is staying with her mother who lives down the street from the hospital.  She has OCP[facility] provided from [facility], but would like to discuss contraception with her primary OB/GYN in [place].  Encouraged follow-up with her primary OB/GYN in the coming weeks.  Discussed strict return precautions regarding potential for retained products, infection, worsening pain or bleeding.    WCB & ANC     Results from last 7 days   Lab Units 04/15/24  1749 04/14/24  2208   WBC Thousand/uL 5.83 5.66   TOTAL NEUT ABS Thousands/µL 2.47 3.05    or Hgb / Hct       Results from last 7 days   Lab Units 04/15/24  1749 04/14/24  2208   HEMOGLOBIN g/dL 11.9 11.4*   HEMATOCRIT % 36.4 34.3*